# Patient Record
Sex: MALE | Race: BLACK OR AFRICAN AMERICAN | NOT HISPANIC OR LATINO | Employment: FULL TIME | URBAN - METROPOLITAN AREA
[De-identification: names, ages, dates, MRNs, and addresses within clinical notes are randomized per-mention and may not be internally consistent; named-entity substitution may affect disease eponyms.]

---

## 2020-11-12 ENCOUNTER — HOSPITAL ENCOUNTER (EMERGENCY)
Facility: HOSPITAL | Age: 40
Discharge: HOME | End: 2020-11-12
Attending: EMERGENCY MEDICINE | Admitting: EMERGENCY MEDICINE

## 2020-11-12 VITALS
RESPIRATION RATE: 16 BRPM | HEART RATE: 72 BPM | WEIGHT: 195 LBS | HEIGHT: 71 IN | SYSTOLIC BLOOD PRESSURE: 135 MMHG | BODY MASS INDEX: 27.3 KG/M2 | DIASTOLIC BLOOD PRESSURE: 100 MMHG | OXYGEN SATURATION: 99 % | TEMPERATURE: 97.5 F

## 2020-11-12 DIAGNOSIS — L03.211 FACIAL CELLULITIS: Primary | ICD-10-CM

## 2020-11-12 LAB
ALBUMIN SERPL-MCNC: 5.1 G/DL (ref 3.5–5.2)
ALBUMIN/GLOB SERPL: 1.6 G/DL
ALP SERPL-CCNC: 71 U/L (ref 39–117)
ALT SERPL W P-5'-P-CCNC: 34 U/L (ref 1–41)
ANION GAP SERPL CALCULATED.3IONS-SCNC: 10.7 MMOL/L (ref 5–15)
AST SERPL-CCNC: 29 U/L (ref 1–40)
BASOPHILS # BLD AUTO: 0.02 10*3/MM3 (ref 0–0.2)
BASOPHILS NFR BLD AUTO: 0.2 % (ref 0–1.5)
BILIRUB SERPL-MCNC: 0.5 MG/DL (ref 0–1.2)
BUN SERPL-MCNC: 15 MG/DL (ref 6–20)
BUN/CREAT SERPL: 13.6 (ref 7–25)
CALCIUM SPEC-SCNC: 9.8 MG/DL (ref 8.6–10.5)
CHLORIDE SERPL-SCNC: 101 MMOL/L (ref 98–107)
CO2 SERPL-SCNC: 25.3 MMOL/L (ref 22–29)
CREAT SERPL-MCNC: 1.1 MG/DL (ref 0.76–1.27)
DEPRECATED RDW RBC AUTO: 42.9 FL (ref 37–54)
EOSINOPHIL # BLD AUTO: 0.16 10*3/MM3 (ref 0–0.4)
EOSINOPHIL NFR BLD AUTO: 1.9 % (ref 0.3–6.2)
ERYTHROCYTE [DISTWIDTH] IN BLOOD BY AUTOMATED COUNT: 12.3 % (ref 12.3–15.4)
GFR SERPL CREATININE-BSD FRML MDRD: 89 ML/MIN/1.73
GLOBULIN UR ELPH-MCNC: 3.1 GM/DL
GLUCOSE SERPL-MCNC: 85 MG/DL (ref 65–99)
HCT VFR BLD AUTO: 44.2 % (ref 37.5–51)
HGB BLD-MCNC: 15.2 G/DL (ref 13–17.7)
IMM GRANULOCYTES # BLD AUTO: 0.03 10*3/MM3 (ref 0–0.05)
IMM GRANULOCYTES NFR BLD AUTO: 0.4 % (ref 0–0.5)
LYMPHOCYTES # BLD AUTO: 2.03 10*3/MM3 (ref 0.7–3.1)
LYMPHOCYTES NFR BLD AUTO: 23.7 % (ref 19.6–45.3)
MCH RBC QN AUTO: 32.3 PG (ref 26.6–33)
MCHC RBC AUTO-ENTMCNC: 34.4 G/DL (ref 31.5–35.7)
MCV RBC AUTO: 93.8 FL (ref 79–97)
MONOCYTES # BLD AUTO: 0.68 10*3/MM3 (ref 0.1–0.9)
MONOCYTES NFR BLD AUTO: 7.9 % (ref 5–12)
NEUTROPHILS NFR BLD AUTO: 5.64 10*3/MM3 (ref 1.7–7)
NEUTROPHILS NFR BLD AUTO: 65.9 % (ref 42.7–76)
NRBC BLD AUTO-RTO: 0 /100 WBC (ref 0–0.2)
PLATELET # BLD AUTO: 246 10*3/MM3 (ref 140–450)
PMV BLD AUTO: 9.8 FL (ref 6–12)
POTASSIUM SERPL-SCNC: 4.7 MMOL/L (ref 3.5–5.2)
PROT SERPL-MCNC: 8.2 G/DL (ref 6–8.5)
RBC # BLD AUTO: 4.71 10*6/MM3 (ref 4.14–5.8)
SODIUM SERPL-SCNC: 137 MMOL/L (ref 136–145)
WBC # BLD AUTO: 8.56 10*3/MM3 (ref 3.4–10.8)

## 2020-11-12 PROCEDURE — 80053 COMPREHEN METABOLIC PANEL: CPT | Performed by: EMERGENCY MEDICINE

## 2020-11-12 PROCEDURE — 96365 THER/PROPH/DIAG IV INF INIT: CPT

## 2020-11-12 PROCEDURE — 85025 COMPLETE CBC W/AUTO DIFF WBC: CPT | Performed by: EMERGENCY MEDICINE

## 2020-11-12 PROCEDURE — 99283 EMERGENCY DEPT VISIT LOW MDM: CPT

## 2020-11-12 RX ORDER — HYDROCODONE BITARTRATE AND ACETAMINOPHEN 7.5; 325 MG/1; MG/1
1 TABLET ORAL ONCE
Status: COMPLETED | OUTPATIENT
Start: 2020-11-12 | End: 2020-11-12

## 2020-11-12 RX ORDER — CLINDAMYCIN PHOSPHATE 600 MG/50ML
600 INJECTION INTRAVENOUS ONCE
Status: COMPLETED | OUTPATIENT
Start: 2020-11-12 | End: 2020-11-12

## 2020-11-12 RX ORDER — CLINDAMYCIN HYDROCHLORIDE 300 MG/1
300 CAPSULE ORAL 3 TIMES DAILY
Qty: 21 CAPSULE | Refills: 0 | Status: SHIPPED | OUTPATIENT
Start: 2020-11-12

## 2020-11-12 RX ORDER — HYDROCODONE BITARTRATE AND ACETAMINOPHEN 5; 325 MG/1; MG/1
1 TABLET ORAL EVERY 4 HOURS PRN
Qty: 16 TABLET | Refills: 0 | Status: SHIPPED | OUTPATIENT
Start: 2020-11-12

## 2020-11-12 RX ORDER — SODIUM CHLORIDE 0.9 % (FLUSH) 0.9 %
10 SYRINGE (ML) INJECTION AS NEEDED
Status: DISCONTINUED | OUTPATIENT
Start: 2020-11-12 | End: 2020-11-12 | Stop reason: HOSPADM

## 2020-11-12 RX ADMIN — CLINDAMYCIN PHOSPHATE 600 MG: 600 INJECTION, SOLUTION INTRAVENOUS at 05:33

## 2020-11-12 RX ADMIN — HYDROCODONE BITARTRATE AND ACETAMINOPHEN 1 TABLET: 7.5; 325 TABLET ORAL at 07:02

## 2020-11-12 NOTE — ED TRIAGE NOTES
Pt to ER from home via [facility] with c/o allergic reaction. Pt states symptoms started on Tuesday night and have been increasing in severity since. Swelling noted to pt's top lip, pt rates pain 10/10. Denies difficulty breathing or handling secretions. Pt is unsure what may have triggered the reaction.      Pt masked in triage, staff in appropriate ppe.

## 2020-11-12 NOTE — ED PROVIDER NOTES
EMERGENCY DEPARTMENT ENCOUNTER    CHIEF COMPLAINT  Chief Complaint: Facial swelling  History given by: Patient  History limited by: None  Room Number: 12/12  PMD: Provider, No Known      HPI:  Pt is a 40 y.o. male who presents complaining of gradual onset of left upper lip/facial swelling for approximately 3 days.  The patient states that it started quite mild but has been progressing now to involve the upper lip as well as the left cheek.  The patient describes it as a dull pain that is nonradiating but progressively worsening.  The patient was not certain if it could have been an allergy so he did take a Benadryl without improvement of symptoms.  The patient has also taken a bare aspirin to treat the symptoms without improvement thus far.  He denies any associated fever/chills, dental pain or caries, sinus pain or drainage, headache, dizziness, neck pain, or any known sick contacts.  The patient states that any movement of the affected area of the face or touch greatly worsened the pain.  There are no current alleviating factors.  The patient denies any previous episodes of similar symptoms.       PAST MEDICAL HISTORY  Active Ambulatory Problems     Diagnosis Date Noted   • No Active Ambulatory Problems     Resolved Ambulatory Problems     Diagnosis Date Noted   • No Resolved Ambulatory Problems     No Additional Past Medical History       PAST SURGICAL HISTORY  History reviewed. No pertinent surgical history.    FAMILY HISTORY  History reviewed. No pertinent family history.    SOCIAL HISTORY  Social History     Socioeconomic History   • Marital status: Single     Spouse name: Not on file   • Number of children: Not on file   • Years of education: Not on file   • Highest education level: Not on file   Tobacco Use   • Smoking status: Current Every Day Smoker     Packs/day: 1.00     Types: Cigarettes   • Smokeless tobacco: Never Used   Substance and Sexual Activity   • Alcohol use: Yes     Alcohol/week: 12.0  standard drinks     Types: 12 Cans of beer per week   • Drug use: Yes     Types: Marijuana     Comment: social   • Sexual activity: Yes     Partners: Female       ALLERGIES  Patient has no known allergies.    REVIEW OF SYSTEMS  Review of Systems   Constitutional: Negative for activity change, appetite change and fever.   HENT: Positive for facial swelling. Negative for congestion and sore throat.    Eyes: Negative.    Respiratory: Negative for cough and shortness of breath.    Cardiovascular: Negative for chest pain and leg swelling.   Gastrointestinal: Negative for abdominal pain, diarrhea and vomiting.   Endocrine: Negative.    Genitourinary: Negative for decreased urine volume and dysuria.   Musculoskeletal: Negative for neck pain.   Skin: Negative for rash and wound.   Allergic/Immunologic: Negative.    Neurological: Negative for weakness, numbness and headaches.   Hematological: Negative.    Psychiatric/Behavioral: Negative.    All other systems reviewed and are negative.      PHYSICAL EXAM  ED Triage Vitals [11/12/20 0510]   Temp Heart Rate Resp BP SpO2   97.5 °F (36.4 °C) 83 18 -- 96 %      Temp src Heart Rate Source Patient Position BP Location FiO2 (%)   Tympanic -- -- -- --       Physical Exam   Constitutional: He is oriented to person, place, and time. No distress.   HENT:   Head: Normocephalic and atraumatic.   There is swelling with induration and erythema to the upper lip with mild swelling to the left cheek.  There is tenderness to palpation but no fluctuant masses felt.   Eyes: Pupils are equal, round, and reactive to light. EOM are normal.   Neck: Normal range of motion. Neck supple.   Cardiovascular: Normal rate, regular rhythm and normal heart sounds.   Pulmonary/Chest: Effort normal and breath sounds normal. No respiratory distress.   Abdominal: Soft. There is no abdominal tenderness. There is no rebound and no guarding.   Musculoskeletal: Normal range of motion.         General: No edema.    Neurological: He is alert and oriented to person, place, and time. He has normal sensation and normal strength.   Skin: Skin is warm and dry.   Psychiatric: Mood and affect normal.   Nursing note and vitals reviewed.      LAB RESULTS  Lab Results (last 24 hours)     Procedure Component Value Units Date/Time    CBC & Differential [[id number]]  (Normal) Collected: 11/12/20 0532    Specimen: Blood Updated: 11/12/20 0546    Narrative:      The following orders were created for panel order CBC & Differential.  Procedure                               Abnormality         Status                     ---------                               -----------         ------                     CBC Auto Differential[[id number]]        Normal              Final result                 Please view results for these tests on the individual orders.    Comprehensive Metabolic Panel [[id number]] Collected: 11/12/20 0532    Specimen: Blood Updated: 11/12/20 0701     Glucose 85 mg/dL      BUN 15 mg/dL      Creatinine 1.10 mg/dL      Sodium 137 mmol/L      Potassium 4.7 mmol/L      Comment: Slight hemolysis detected by analyzer. Results may be affected.        Chloride 101 mmol/L      CO2 25.3 mmol/L      Calcium 9.8 mg/dL      Total Protein 8.2 g/dL      Albumin 5.10 g/dL      ALT (SGPT) 34 U/L      Comment: Specimen hemolyzed.  Results may be affected.        AST (SGOT) 29 U/L      Comment: Slight hemolysis detected by analyzer. Results may be affected.        Alkaline Phosphatase 71 U/L      Total Bilirubin 0.5 mg/dL      eGFR  African Amer 89 mL/min/1.73      Globulin 3.1 gm/dL      A/G Ratio 1.6 g/dL      BUN/Creatinine Ratio 13.6     Anion Gap 10.7 mmol/L     Narrative:      GFR Normal >60  Chronic Kidney Disease <60  Kidney Failure <15      CBC Auto Differential [[id number]]  (Normal) Collected: 11/12/20 0532    Specimen: Blood Updated: 11/12/20 0546     WBC 8.56 10*3/mm3      RBC 4.71 10*6/mm3      Hemoglobin 15.2 g/dL      Hematocrit  44.2 %      MCV 93.8 fL      MCH 32.3 pg      MCHC 34.4 g/dL      RDW 12.3 %      RDW-SD 42.9 fl      MPV 9.8 fL      Platelets 246 10*3/mm3      Neutrophil % 65.9 %      Lymphocyte % 23.7 %      Monocyte % 7.9 %      Eosinophil % 1.9 %      Basophil % 0.2 %      Immature Grans % 0.4 %      Neutrophils, Absolute 5.64 10*3/mm3      Lymphocytes, Absolute 2.03 10*3/mm3      Monocytes, Absolute 0.68 10*3/mm3      Eosinophils, Absolute 0.16 10*3/mm3      Basophils, Absolute 0.02 10*3/mm3      Immature Grans, Absolute 0.03 10*3/mm3      nRBC 0.0 /100 WBC           I ordered the above labs and reviewed the results    RADIOLOGY  No orders to display        I ordered the above noted radiological studies. Interpreted by radiologist.  Reviewed by me in PACS.       PROCEDURES  Procedures      PROGRESS AND CONSULTS     The patient was wearing a facemask upon entrance into the room and remained in such throughout their visit.  I was wearing PPE including a facemask, eye protection, as well as gloves at any point entering the room and throughout the visit    0630  Upon reevaluation, the patient is resting comfortably with stable vital signs and no new complaints.  Did inform him that his CBC is unremarkable.  At this point, we will place him on oral antibiotics as well as medication to treat his discomfort.  I reminded him that if the swelling were to worsen especially into his tongue or throat or any signs of shortness of breath, that he is to return to the ER immediately.  The patient agrees with the plan and all questions have been answered.      MEDICAL DECISION MAKING  Results were reviewed/discussed with the patient and they were also made aware of online access. Pt also made aware that some labs, such as cultures, will not be resulted during ER visit and follow up with PMD is necessary.     MDM  Number of Diagnoses or Management Options     Amount and/or Complexity of Data Reviewed  Tests in the medicine section of CPT®:  ordered and reviewed  Review and summarize past medical records: yes (There are no previous emergency room records available for review)           DIAGNOSIS  Final diagnoses:   Facial cellulitis       DISPOSITION  DISCHARGE    Patient discharged in stable condition.    Reviewed implications of results, diagnosis, meds, responsibility to follow up, warning signs and symptoms of possible worsening, potential complications and reasons to return to ER    Patient/Family voiced understanding of above instructions.    Discussed plan for discharge, as there is no emergent indication for admission. Patient referred to primary care provider for BP management due to today's BP. Pt/family is agreeable and understands need for follow up and repeat testing.  Pt is aware that discharge does not mean that nothing is wrong but it indicates no emergency is present that requires admission and they must continue care with follow-up as given below or physician of their choice.     FOLLOW-UP  [facility] RE[facility]  [facility] [id number] [phone]  Schedule an appointment as soon as possible for a visit            Medication List      New Prescriptions    clindamycin 300 MG capsule  Commonly known as: CLEOCIN  Take 1 capsule by mouth 3 (Three) Times a Day.     HYDROcodone-acetaminophen 5-325 MG per tablet  Commonly known as: NORCO  Take 1 tablet by mouth Every 4 (Four) Hours As Needed for Moderate Pain .           Where to Get Your Medications      You can get these medications from any pharmacy    Bring a paper prescription for each of these medications  · clindamycin 300 MG capsule  · HYDROcodone-acetaminophen 5-325 MG per tablet           Latest Documented Vital Signs:  As of 23:11 EST  BP- 135/100 HR- 72 Temp- 97.5 °F (36.4 °C) (Tympanic) O2 sat- 99%         Cristobal Armas MD  11/12/20 9877